# Patient Record
Sex: FEMALE | Race: BLACK OR AFRICAN AMERICAN | NOT HISPANIC OR LATINO
[De-identification: names, ages, dates, MRNs, and addresses within clinical notes are randomized per-mention and may not be internally consistent; named-entity substitution may affect disease eponyms.]

---

## 2019-04-09 PROBLEM — Z00.00 ENCOUNTER FOR PREVENTIVE HEALTH EXAMINATION: Status: ACTIVE | Noted: 2019-04-09

## 2019-04-29 ENCOUNTER — APPOINTMENT (OUTPATIENT)
Dept: MAMMOGRAPHY | Facility: HOSPITAL | Age: 49
End: 2019-04-29

## 2019-05-06 ENCOUNTER — RESULT REVIEW (OUTPATIENT)
Age: 49
End: 2019-05-06

## 2021-01-26 ENCOUNTER — RESULT REVIEW (OUTPATIENT)
Age: 51
End: 2021-01-26

## 2021-07-29 ENCOUNTER — EMERGENCY (EMERGENCY)
Facility: HOSPITAL | Age: 51
LOS: 1 days | Discharge: HOME | End: 2021-07-29
Attending: EMERGENCY MEDICINE | Admitting: EMERGENCY MEDICINE
Payer: COMMERCIAL

## 2021-07-29 VITALS
HEART RATE: 65 BPM | WEIGHT: 164.91 LBS | OXYGEN SATURATION: 98 % | RESPIRATION RATE: 18 BRPM | SYSTOLIC BLOOD PRESSURE: 128 MMHG | TEMPERATURE: 98 F | DIASTOLIC BLOOD PRESSURE: 76 MMHG

## 2021-07-29 DIAGNOSIS — F17.200 NICOTINE DEPENDENCE, UNSPECIFIED, UNCOMPLICATED: ICD-10-CM

## 2021-07-29 DIAGNOSIS — R06.02 SHORTNESS OF BREATH: ICD-10-CM

## 2021-07-29 DIAGNOSIS — R09.81 NASAL CONGESTION: ICD-10-CM

## 2021-07-29 PROCEDURE — 71046 X-RAY EXAM CHEST 2 VIEWS: CPT | Mod: 26

## 2021-07-29 PROCEDURE — 99285 EMERGENCY DEPT VISIT HI MDM: CPT | Mod: 25

## 2021-07-29 PROCEDURE — 93010 ELECTROCARDIOGRAM REPORT: CPT

## 2021-07-30 LAB
ALBUMIN SERPL ELPH-MCNC: 4 G/DL — SIGNIFICANT CHANGE UP (ref 3.3–5)
ALP SERPL-CCNC: 80 U/L — SIGNIFICANT CHANGE UP (ref 40–120)
ALT FLD-CCNC: 29 U/L — SIGNIFICANT CHANGE UP (ref 10–45)
ANION GAP SERPL CALC-SCNC: 9 MMOL/L — SIGNIFICANT CHANGE UP (ref 5–17)
ANISOCYTOSIS BLD QL: SLIGHT — SIGNIFICANT CHANGE UP
APTT BLD: 40.6 SEC — HIGH (ref 27.5–35.5)
AST SERPL-CCNC: 31 U/L — SIGNIFICANT CHANGE UP (ref 10–40)
BASOPHILS # BLD AUTO: 0 K/UL — SIGNIFICANT CHANGE UP (ref 0–0.2)
BASOPHILS NFR BLD AUTO: 0 % — SIGNIFICANT CHANGE UP (ref 0–2)
BILIRUB SERPL-MCNC: 0.2 MG/DL — SIGNIFICANT CHANGE UP (ref 0.2–1.2)
BUN SERPL-MCNC: 14 MG/DL — SIGNIFICANT CHANGE UP (ref 7–23)
CALCIUM SERPL-MCNC: 9.4 MG/DL — SIGNIFICANT CHANGE UP (ref 8.4–10.5)
CHLORIDE SERPL-SCNC: 107 MMOL/L — SIGNIFICANT CHANGE UP (ref 96–108)
CO2 SERPL-SCNC: 25 MMOL/L — SIGNIFICANT CHANGE UP (ref 22–31)
CREAT SERPL-MCNC: 0.92 MG/DL — SIGNIFICANT CHANGE UP (ref 0.5–1.3)
DACRYOCYTES BLD QL SMEAR: SLIGHT — SIGNIFICANT CHANGE UP
EOSINOPHIL # BLD AUTO: 0.16 K/UL — SIGNIFICANT CHANGE UP (ref 0–0.5)
EOSINOPHIL NFR BLD AUTO: 1.8 % — SIGNIFICANT CHANGE UP (ref 0–6)
GLUCOSE SERPL-MCNC: 89 MG/DL — SIGNIFICANT CHANGE UP (ref 70–99)
HCT VFR BLD CALC: 36.9 % — SIGNIFICANT CHANGE UP (ref 34.5–45)
HGB BLD-MCNC: 12.3 G/DL — SIGNIFICANT CHANGE UP (ref 11.5–15.5)
INR BLD: 0.9 — SIGNIFICANT CHANGE UP (ref 0.88–1.16)
LYMPHOCYTES # BLD AUTO: 3.94 K/UL — HIGH (ref 1–3.3)
LYMPHOCYTES # BLD AUTO: 44.7 % — HIGH (ref 13–44)
MACROCYTES BLD QL: SLIGHT — SIGNIFICANT CHANGE UP
MANUAL SMEAR VERIFICATION: SIGNIFICANT CHANGE UP
MCHC RBC-ENTMCNC: 31.9 PG — SIGNIFICANT CHANGE UP (ref 27–34)
MCHC RBC-ENTMCNC: 33.3 GM/DL — SIGNIFICANT CHANGE UP (ref 32–36)
MCV RBC AUTO: 95.8 FL — SIGNIFICANT CHANGE UP (ref 80–100)
MONOCYTES # BLD AUTO: 0.31 K/UL — SIGNIFICANT CHANGE UP (ref 0–0.9)
MONOCYTES NFR BLD AUTO: 3.5 % — SIGNIFICANT CHANGE UP (ref 2–14)
NEUTROPHILS # BLD AUTO: 4.33 K/UL — SIGNIFICANT CHANGE UP (ref 1.8–7.4)
NEUTROPHILS NFR BLD AUTO: 49.1 % — SIGNIFICANT CHANGE UP (ref 43–77)
NT-PROBNP SERPL-SCNC: 29 PG/ML — SIGNIFICANT CHANGE UP (ref 0–300)
PLAT MORPH BLD: ABNORMAL
PLATELET # BLD AUTO: 288 K/UL — SIGNIFICANT CHANGE UP (ref 150–400)
POIKILOCYTOSIS BLD QL AUTO: SLIGHT — SIGNIFICANT CHANGE UP
POLYCHROMASIA BLD QL SMEAR: SLIGHT — SIGNIFICANT CHANGE UP
POTASSIUM SERPL-MCNC: 4.4 MMOL/L — SIGNIFICANT CHANGE UP (ref 3.5–5.3)
POTASSIUM SERPL-SCNC: 4.4 MMOL/L — SIGNIFICANT CHANGE UP (ref 3.5–5.3)
PROT SERPL-MCNC: 6.8 G/DL — SIGNIFICANT CHANGE UP (ref 6–8.3)
PROTHROM AB SERPL-ACNC: 10.9 SEC — SIGNIFICANT CHANGE UP (ref 10.6–13.6)
RBC # BLD: 3.85 M/UL — SIGNIFICANT CHANGE UP (ref 3.8–5.2)
RBC # FLD: 14.2 % — SIGNIFICANT CHANGE UP (ref 10.3–14.5)
RBC BLD AUTO: ABNORMAL
SODIUM SERPL-SCNC: 141 MMOL/L — SIGNIFICANT CHANGE UP (ref 135–145)
TROPONIN T SERPL-MCNC: <0.01 NG/ML — SIGNIFICANT CHANGE UP (ref 0–0.01)
VARIANT LYMPHS # BLD: 0.9 % — SIGNIFICANT CHANGE UP (ref 0–6)
WBC # BLD: 8.82 K/UL — SIGNIFICANT CHANGE UP (ref 3.8–10.5)
WBC # FLD AUTO: 8.82 K/UL — SIGNIFICANT CHANGE UP (ref 3.8–10.5)

## 2021-07-30 PROCEDURE — 85730 THROMBOPLASTIN TIME PARTIAL: CPT

## 2021-07-30 PROCEDURE — 36415 COLL VENOUS BLD VENIPUNCTURE: CPT

## 2021-07-30 PROCEDURE — 80053 COMPREHEN METABOLIC PANEL: CPT

## 2021-07-30 PROCEDURE — 71046 X-RAY EXAM CHEST 2 VIEWS: CPT | Mod: 26

## 2021-07-30 PROCEDURE — 99283 EMERGENCY DEPT VISIT LOW MDM: CPT | Mod: 25

## 2021-07-30 PROCEDURE — 71046 X-RAY EXAM CHEST 2 VIEWS: CPT

## 2021-07-30 PROCEDURE — 85610 PROTHROMBIN TIME: CPT

## 2021-07-30 PROCEDURE — 93005 ELECTROCARDIOGRAM TRACING: CPT

## 2021-07-30 PROCEDURE — 84484 ASSAY OF TROPONIN QUANT: CPT

## 2021-07-30 PROCEDURE — 94640 AIRWAY INHALATION TREATMENT: CPT

## 2021-07-30 PROCEDURE — 85025 COMPLETE CBC W/AUTO DIFF WBC: CPT

## 2021-07-30 PROCEDURE — 83880 ASSAY OF NATRIURETIC PEPTIDE: CPT

## 2021-07-30 RX ORDER — PSEUDOEPHEDRINE HCL 30 MG
30 TABLET ORAL ONCE
Refills: 0 | Status: COMPLETED | OUTPATIENT
Start: 2021-07-30 | End: 2021-07-30

## 2021-07-30 RX ORDER — FLUTICASONE PROPIONATE 50 MCG
1 SPRAY, SUSPENSION NASAL ONCE
Refills: 0 | Status: COMPLETED | OUTPATIENT
Start: 2021-07-30 | End: 2021-07-30

## 2021-07-30 RX ADMIN — Medication 30 MILLIGRAM(S): at 01:43

## 2021-07-30 RX ADMIN — Medication 1 SPRAY(S): at 02:00

## 2021-07-30 NOTE — ED PROVIDER NOTE - CLINICAL SUMMARY MEDICAL DECISION MAKING FREE TEXT BOX
SOB when laying down, c/o can't get air through her nose, afebrile, nontoxic, speaking in full sentences. no tachycardia, no tachypnea, no hypoxia. doubt PE, lungs clear, doubt CHF. doubt acs  -check labs, ekg  -cxr  -flonase

## 2021-07-30 NOTE — ED PROVIDER NOTE - OBJECTIVE STATEMENT
51F no PMH c/o difficulty breathing, pt states occurs 51F no PMH c/o difficulty breathing, pt states occurs when she lays down to go to sleep. states feels like she can't breath through her nose.  feels congested. no chest pain. no LE swelling. no fevers. no cough. no recent travel. no sick contacts.  states does not feel short of breath currently. pt states she feels she can't sleep because she can't breath when she lays down.  saw her PMD and was given promethazine and azelastine without relief.

## 2021-07-30 NOTE — ED ADULT NURSE NOTE - OBJECTIVE STATEMENT
Received pt from triage, A&OX4, Sitting up on stretcher c/o SOB when laying down. Pt stated it stated Tuesday after doing work around the house. She now uses  pillows to sleep, and is afraid to sleep.  Denies any cough, CP or, leg swelling.  Pt spo2 100% on RA, talks in complete sentence. IV 20G inserted and labs drawn waiting for further orders

## 2021-07-30 NOTE — ED ADULT NURSE NOTE - NSFALLRSKPASTHIST_ED_ALL_ED
What Type Of Note Output Would You Prefer (Optional)?: Standard Output How Severe Is Your Rash?: moderate Is This A New Presentation, Or A Follow-Up?: Rash no

## 2021-07-30 NOTE — ED PROVIDER NOTE - PROGRESS NOTE DETAILS
no resp distress, speaking in full sentences, recommend f/u with ENT  I have discussed the discharge plan with the patient. The patient agrees with the plan, as discussed.  The patient understands Emergency Department diagnosis is a preliminary diagnosis often based on limited information and that the patient must adhere to the follow-up plan as discussed.  The patient understands that if the symptoms worsen or if prescribed medications do not have the desired/planned effect that the patient may return to the Emergency Department at any time for further evaluation and treatment.

## 2021-07-30 NOTE — ED PROVIDER NOTE - NSFOLLOWUPINSTRUCTIONS_ED_ALL_ED_FT
Shortness of Breath    WHAT YOU NEED TO KNOW:    Shortness of breath is a feeling that you cannot get enough air when you breathe in. You may have this feeling only during activity, or all the time. Your symptoms can range from mild to severe. Shortness of breath may be a sign of a serious health condition that needs immediate care.    DISCHARGE INSTRUCTIONS:    Return to the emergency department if:   •Your signs and symptoms are the same or worse within 24 hours of treatment.     •The skin over your ribs or on your neck sinks in when you breathe.     •You feel confused or dizzy.    Contact your healthcare provider if:   •You have new or worsening symptoms.    •You have questions or concerns about your condition or care.    Medicines:   •Medicines may be used to treat the cause of your symptoms. You may need medicine to treat a bacterial infection or reduce anxiety. Other medicines may be used to open your airway, reduce swelling, or remove extra fluid. If you have a heart condition, you may need medicine to help your heart beat more strongly or regularly.    •Take your medicine as directed. Contact your healthcare provider if you think your medicine is not helping or if you have side effects. Tell him or her if you are allergic to any medicine. Keep a list of the medicines, vitamins, and herbs you take. Include the amounts, and when and why you take them. Bring the list or the pill bottles to follow-up visits. Carry your medicine list with you in case of an emergency.    Manage shortness of breath:   •Create an action plan. You and your healthcare provider can work together to create a plan for how to handle shortness of breath. The plan can include daily activities, treatment changes, and what to do if you have severe breathing problems.    •Lean forward on your elbows when you sit. This helps your lungs expand and may make it easier to breathe.    •Use pursed-lip breathing any time you feel short of breath. Breathe in through your nose and then slowly breathe out through your mouth with your lips slightly puckered. It should take you twice as long to breathe out as it did to breathe in.    •Do not smoke. Nicotine and other chemicals in cigarettes and cigars can cause lung damage and make shortness of breath worse. Ask your healthcare provider for information if you currently smoke and need help to quit. E-cigarettes or smokeless tobacco still contain nicotine. Talk to your healthcare provider before you use these products.    •Reach or maintain a healthy weight. Your healthcare provider can help you create a safe weight loss plan if you re overweight.    •Exercise as directed. Exercise can help your lungs work more easily. Exercise can also help you lose weight if needed. Try to get at least 30 minutes of exercise most days of the week.    Follow up with your healthcare provider or specialist as directed: Write down your questions so you remember to ask them during your visits.

## 2021-07-30 NOTE — ED ADULT NURSE NOTE - NSIMPLEMENTINTERV_GEN_ALL_ED
Implemented All Universal Safety Interventions:  [place] to call system. Call bell, personal items and telephone within reach. Instruct patient to call for assistance. Room bathroom lighting operational. Non-slip footwear when patient is off stretcher. Physically safe environment: no spills, clutter or unnecessary equipment. Stretcher in lowest position, wheels locked, appropriate side rails in place.

## 2021-07-30 NOTE — ED PROVIDER NOTE - PATIENT PORTAL LINK FT
You can access the FollowMyHealth Patient Portal offered by [facility] by registering at the following website: [email or web address]. By joining [employer]’s FollowMyHealth portal, you will also be able to view your health information using other applications (apps) compatible with our system.

## 2021-07-30 NOTE — ED PROVIDER NOTE - NSFOLLOWUPCLINICS_GEN_ALL_ED_FT
[facility]  Otolaryngology (ENT)  [address], [address]  [place] [address]  Phone: [phone]  Fax:     [facility]  [facility]tol[facility] (ENT)  [address] [address] [place] [address]  Phone: [phone]  Fax:

## 2021-09-21 ENCOUNTER — EMERGENCY (EMERGENCY)
Facility: HOSPITAL | Age: 51
LOS: 1 days | Discharge: HOME | End: 2021-09-21
Attending: STUDENT IN AN ORGANIZED HEALTH CARE EDUCATION/TRAINING PROGRAM | Admitting: STUDENT IN AN ORGANIZED HEALTH CARE EDUCATION/TRAINING PROGRAM
Payer: MEDICAID

## 2021-09-21 VITALS
RESPIRATION RATE: 18 BRPM | OXYGEN SATURATION: 100 % | HEART RATE: 60 BPM | SYSTOLIC BLOOD PRESSURE: 129 MMHG | DIASTOLIC BLOOD PRESSURE: 76 MMHG | TEMPERATURE: 98 F

## 2021-09-21 PROBLEM — Z78.9 OTHER SPECIFIED HEALTH STATUS: Chronic | Status: ACTIVE | Noted: 2021-07-30

## 2021-09-21 PROCEDURE — 99284 EMERGENCY DEPT VISIT MOD MDM: CPT

## 2021-09-21 PROCEDURE — 99282 EMERGENCY DEPT VISIT SF MDM: CPT

## 2021-09-21 NOTE — ED PROVIDER NOTE - CLINICAL SUMMARY MEDICAL DECISION MAKING FREE TEXT BOX
50 yo female presenting w/ chemical exposure to eye. Copious irrigation applied w/ Anthony's Lens and NS. Will consult optho for further recs.

## 2021-09-21 NOTE — CONSULT NOTE ADULT - SUBJECTIVE AND OBJECTIVE BOX
[facility] DEPARTMENT OF OPHTHALMOLOGY - INITIAL ADULT CONSULT  -----------------------------------------------------------------------------  Rachel Farfan MD, MPH, PGY-3  Pager: [phone]  -----------------------------------------------------------------------------    HPI: 50 yo female no past medical or ocular history presenting with left eye irritation after fake lash removal glue went into her eye. Patient experienced significant burning pain and called 911 for assistance. She was advised to rinse out her eye and go to the ED. Patient endorses blurred vision OS and eye pain. In the ED her left eye was irrigated with 500cc NS.     PMH: none   POcHx: denies surg/laser  FH: denies glc/amd  Social History: denies etoh/tobacco  Ophthalmic Medications: none  Allergies: NKDA    Review of Systems: per HPI     VITALS: T(C): 36.4 (09-21-21 @ 14:11)  T(F): 97.6 (09-21-21 @ 14:11), Max: 97.6 (09-21-21 @ 14:11)  HR: 60 (09-21-21 @ 14:11) (60 - 60)  BP: 129/76 (09-21-21 @ 14:11) (129/76 - 129/76)  RR:  (18 - 18)  SpO2:  (100% - 100%)  Wt(kg): --  General: AAO x 3, appropriate mood and affect    Ophthalmology Exam:  Visual acuity (sc): 20/30 OD, 20/60 with PHI to 20/40 OS   Pupils: PERRL OU, no APD  Ttono: 16 OU  Extraocular movements (EOMs): Full OU, no pain, no diplopia  Confrontational Visual Field (CVF): Full OU  Color Plates: 12/12 OU    Pen Light Exam (PLE)  External: Flat OU  Lids/Lashes/Lacrimal Ducts: Flat OU    Sclera/Conjunctiva: W+Q OD, tr injection OS   Cornea: DTUBT OU, tr to 1+ SPK OS   Anterior Chamber: D+F OU    Iris: Flat OU  Lens: NS OU     Fundus Exam: dilated with 1% tropicamide and 2.5% phenylephrine  Approval obtained from primary team for dilation  Patient aware that pupils can remained dilated for at least 4-6 hours  Exam performed with 20D lens    Vitreous: wnl OU  Disc, cup/disc: sharp and pink, 0.4 OU  Macula: wnl OU  Vessels: wnl OU  Periphery: wnl OU    Labs/Imaging:  None     pH 7.0     Assessment and Recommendations:  50 yo female no past medical or ocular history presenting with left eye irritation after fake lash removal glue went into her eye. Patient without corneal abrasion or conjunctival abrasion though with dry eye OS>OD. Please see recs:     #Pt s/p contact with lash glue remover OS   - Preservative-free artificial tears q2hr OS   - Lacrilube QHS OS   - Can also use artificial tears OD in s/o dry eye syndrome   - pH wnl   - Advised to avoid false lashes   - Will follow as outpatient     Findings discussed with pt and primary team.     Case DW Dr. Connell, attending.     Outpatient follow-up: Patient should follow-up with his/her ophthalmologist or with [facility] Department of Ophthalmology within 1 week of after discharge at:    [address] [facility]  [place] [address]  [phone]    Rachel Farfan MD, MPH PGY-3  Pager: [phone]  [employer] Voice: [phone]   Also available on Microsoft Teams [facility] DEPARTMENT OF OPHTHALMOLOGY - INITIAL ADULT CONSULT  -----------------------------------------------------------------------------  Rachel Farfan MD, MPH, PGY-3  Pager: [phone]  -----------------------------------------------------------------------------    HPI: 52 yo female no past medical or ocular history presenting with left eye irritation after fake lash removal glue went into her eye. Patient experienced significant burning pain and called 911 for assistance. She was advised to rinse out her eye and go to the ED. Patient endorses blurred vision OS and eye pain. In the ED her left eye was irrigated with 500cc NS.     PMH: none   POcHx: denies surg/laser  FH: denies glc/amd  Social History: denies etoh/tobacco  Ophthalmic Medications: none  Allergies: NKDA    Review of Systems: per HPI     VITALS: T(C): 36.4 (09-21-21 @ 14:11)  T(F): 97.6 (09-21-21 @ 14:11), Max: 97.6 (09-21-21 @ 14:11)  HR: 60 (09-21-21 @ 14:11) (60 - 60)  BP: 129/76 (09-21-21 @ 14:11) (129/76 - 129/76)  RR:  (18 - 18)  SpO2:  (100% - 100%)  Wt(kg): --  General: AAO x 3, appropriate mood and affect    Ophthalmology Exam:  Visual acuity (sc): 20/30 OD, 20/60 with PHI to 20/40 OS (*pt was in the middle of ocular irrigation and had limited effort during the exam)  Pupils: PERRL OU, no APD  Ttono: 16 OU  Extraocular movements (EOMs): Full OU, no pain, no diplopia  Confrontational Visual Field (CVF): Full OU  Color Plates: 12/12 OU    Pen Light Exam (PLE)  External: Flat OU  Lids/Lashes/Lacrimal Ducts: Flat OU    Sclera/Conjunctiva: W+Q OD, tr injection OS   Cornea: DTUBT OU, tr to 1+ SPK OS   Anterior Chamber: D+F OU    Iris: Flat OU  Lens: NS OU     Fundus Exam: dilated with 1% tropicamide and 2.5% phenylephrine  Approval obtained from primary team for dilation  Patient aware that pupils can remained dilated for at least 4-6 hours  Exam performed with 20D lens    Vitreous: wnl OU  Disc, cup/disc: sharp and pink, 0.4 OU  Macula: wnl OU  Vessels: wnl OU  Periphery: wnl OU    Labs/Imaging:  None     pH 7.0     Assessment and Recommendations:  52 yo female no past medical or ocular history presenting with left eye irritation after fake lash removal glue went into her eye. Patient without corneal abrasion or conjunctival abrasion though with dry eye OS>OD. Please see recs:     #Pt s/p contact with lash glue remover OS   - Preservative-free artificial tears QID OS  - Lacrilube QHS OS   - Can also use artificial tears OD in s/o dry eye syndrome   - pH wnl   - Advised to avoid false lashes   - Will follow as outpatient     Findings discussed with pt and primary team.     Case SDW Dr. Connell, attending.     Outpatient follow-up: Patient should follow-up with his/her ophthalmologist or with [facility] Department of Ophthalmology within 1 week of after discharge at:    [address] [facility]  [place] [address]  [phone]    Rachel Farfan MD, MPH PGY-3  Pager: [phone]  [employer] Voice: [phone]   Also available on Microsoft Teams [facility] DEPARTMENT OF OPHTHALMOLOGY - INITIAL ADULT CONSULT  -----------------------------------------------------------------------------  Rachel Farfan MD, MPH, PGY-3  Pager: [phone]  -----------------------------------------------------------------------------    HPI: 50 yo female no past medical or ocular history presenting with left eye irritation after fake lash removal glue went into her eye. Patient experienced significant burning pain and called 911 for assistance. She was advised to rinse out her eye and go to the ED. Patient endorses blurred vision OS and eye pain. In the ED her left eye was irrigated with 500cc NS.     PMH: none   POcHx: denies surg/laser  FH: denies glc/amd  Social History: denies etoh/tobacco  Ophthalmic Medications: none  Allergies: NKDA    Review of Systems: per HPI     VITALS: T(C): 36.4 (09-21-21 @ 14:11)  T(F): 97.6 (09-21-21 @ 14:11), Max: 97.6 (09-21-21 @ 14:11)  HR: 60 (09-21-21 @ 14:11) (60 - 60)  BP: 129/76 (09-21-21 @ 14:11) (129/76 - 129/76)  RR:  (18 - 18)  SpO2:  (100% - 100%)  Wt(kg): --  General: AAO x 3, appropriate mood and affect    Ophthalmology Exam:  Visual acuity (sc): 20/30 OD, 20/60 with PHI to 20/40 OS (*pt was in the middle of ocular irrigation and had limited effort during the exam)  Pupils: PERRL OU, no APD  Ttono: 16 OU  Extraocular movements (EOMs): Full OU, no pain, no diplopia  Confrontational Visual Field (CVF): Full OU  Color Plates: 12/12 OU  pH=7.0 OU    Slit Lamp Exam  External: Flat OU, no FB upon lid eversion OU, no glue OU  Lids/Lashes/Lacrimal Ducts: Flat OU    Sclera/Conjunctiva: W+Q OD, tr injection OS   Cornea: DTUBT OU, tr to 1+ SPK OS   Anterior Chamber: D+F OU    Iris: Flat OU  Lens: NS OU     Fundus Exam: dilated with 1% tropicamide and 2.5% phenylephrine  Approval obtained from primary team for dilation  Patient aware that pupils can remained dilated for at least 4-6 hours  Exam performed with 20D lens    Vitreous: wnl OU  Disc, cup/disc: sharp and pink, 0.4 OU  Macula: wnl OU  Vessels: wnl OU  Periphery: wnl OU, CR scars inferiorly and superonasally OD, no RH/RT/RD for 360 degrees OU    Labs/Imaging:  None     pH 7.0     Assessment and Recommendations:  50 yo female no past medical or ocular history presenting with left eye irritation after fake lash removal glue went into her eye. Patient without corneal abrasion or conjunctival abrasion though with dry eye OS>OD. Please see recs:     #Pt s/p contact with lash glue remover OS   - Preservative-free artificial tears QID OS  - Lacrilube QHS OS   - Can also use artificial tears OD in s/o dry eye syndrome   - pH wnl   - Advised to avoid false lashes   - Will follow as outpatient within 2-3 days after discharge    Findings discussed with pt and primary team.     Case SDW Dr. Connell, attending.     Outpatient follow-up: Patient should follow-up with his/her ophthalmologist or with [facility] Department of Ophthalmology within 2-3 days of after discharge, sooner if symptoms worsen or change at:    [address] [facility]  [place] [address]  [phone]    Rachel Farfan MD, MPH PGY-3  Pager: [phone]  [employer] Voice: [phone]   Also available on [employer] Teams

## 2021-09-21 NOTE — ED ADULT TRIAGE NOTE - CHIEF COMPLAINT QUOTE
pt coming from home, pt called 911 after getting eye lash glue in her eye.  pt c/o irritation to left eye

## 2021-09-21 NOTE — ED PROVIDER NOTE - NSFOLLOWUPCLINICS_GEN_ALL_ED_FT
[facility] Ophthalmology  Ophthalmology  [address] [place] [address], [facility] [address]  [place] [address]  Phone: [phone]  Fax:

## 2021-09-21 NOTE — ED PROVIDER NOTE - PATIENT PORTAL LINK FT
You can access the FollowMyHealth Patient Portal offered by [facility] by registering at the following website: [email or web address]. By joining [employer]’s FollowMyHealth portal, you will also be able to view your health information using other applications (apps) compatible with our system.

## 2021-09-21 NOTE — ED PROVIDER NOTE - NSFOLLOWUPINSTRUCTIONS_ED_ALL_ED_FT
1) Please follow up with Ophthalmology in 24-48 hours  2) Seek immediate medical care for any new or returning symptoms including but not limited severe pain, vision loss, fevers  3) Please apply artificial tears every 6 hours to both eyes for comfort

## 2021-09-21 NOTE — ED PROVIDER NOTE - ATTENDING CONTRIBUTION TO CARE
Lester Avalos DO:  patient seen and evaluated with the resident.  I was present for key portions of the History & Physical, and I agree with the Impression & Plan. 50 yo f no reported pmh, pw irritation to left eye. reports approx 1 h pta was receiving eyelash extensions, then had some glue fall into eye. had glue removal agent added to eye, propanediol, resulting in further irritation. pt arrives w/ mild left conj injection. visual acuity intact, perrla, eomi. pt irrigated eye at [facility]. will give tetracaine, irrigate w/ eugenia lens and reassess visual acuity and fluorescein stain. likely call ophtho to determine if any other concerns to be addressed.

## 2021-09-21 NOTE — ED PROVIDER NOTE - PHYSICAL EXAMINATION
Gen: NAD   Head: normal appearing  HEENT: oral MM   Eye:   PERRL   The conjunctiva is not injected   no visible foreign body or remaining glue  EOMs are grossly intact  Visual Acuity: S20/50 and D20/40     Lung: no respiratory distress, speaking in full sentences    MSK: no visible deformities  Neuro: No focal deficits, alert and grossly oriented  Skin: Warm  Psych: normal affect

## 2021-09-21 NOTE — ED PROVIDER NOTE - PROGRESS NOTE DETAILS
Lester Avalos DO: joão by ophtho. recs appreciated. to fu outpatient after completion of irrigation. pt understands and agrees w/ plan. Patient comfortable. Eye irrigation complete. Given lubricating eye drops before leaving. Advised to follow up w/ ophthalmology.

## 2021-09-21 NOTE — CONSULT NOTE ADULT - ATTENDING COMMENTS
I have interviewed and examined the patient and reviewed the residents note including the history, exam, assessment, and plan.  I agree with the residents assessment and plan.    52 yo female no past medical or ocular history presenting with left eye irritation after fake lash removal glue went into her eye. Patient without corneal abrasion or conjunctival abrasion though with dry eye OS>OD. Please see recs:     #Pt s/p contact with lash glue remover OS   - Preservative-free artificial tears QID OS  - Lacrilube QHS OS   - Can also use artificial tears OD in s/o dry eye syndrome   - pH wnl   - Advised to avoid false lashes   - Will follow as outpatient within 2-3 days after discharge    Findings discussed with pt and primary team.     Wilma Connell MD

## 2021-09-21 NOTE — ED PROVIDER NOTE - NS ED ROS FT
GENERAL: No fever  EYES: see hpi, positive for eye pain   HEENT: no sore throat  CARDIAC: no chest pain  PULMONARY: no SOB  GI: no abdominal pain  : no dysuria   SKIN: no rashes  NEURO: no headache  MSK: no joint pain

## 2021-09-21 NOTE — ED PROVIDER NOTE - OBJECTIVE STATEMENT
50 yo female w/ no pmhx of eye dx, who presents w/ L eye pain. The patient was getting her lashes done. The  accidently put glue in between the lids of the patient left eye. The eye felt like it was getting stuck together. The  then applied glue remover. This got on to the pt's eye and began to burn. The pt irrigated the eye for a few seconds and then called EMS who brought the pt here. Pt does not wear contacts. R eye is unaffected.

## 2025-09-10 ENCOUNTER — APPOINTMENT (OUTPATIENT)
Dept: OTOLARYNGOLOGY | Facility: CLINIC | Age: 55
End: 2025-09-10